# Patient Record
(demographics unavailable — no encounter records)

---

## 2024-11-27 NOTE — PHYSICAL EXAM
[Chaperone Present] : A chaperone was present in the examining room during all aspects of the physical examination [[id number]] : A chaperone was present during the pelvic exam. [Appropriately responsive] : appropriately responsive [Alert] : alert [No Acute Distress] : no acute distress [No Lymphadenopathy] : no lymphadenopathy [Soft] : soft [Non-tender] : non-tender [Non-distended] : non-distended [No HSM] : No HSM [No Lesions] : no lesions [No Mass] : no mass [Oriented x3] : oriented x3 [Examination Of The Breasts] : a normal appearance [No Discharge] : no discharge [No Masses] : no breast masses were palpable [Labia Majora] : normal [Labia Minora] : normal [Normal] : normal [Uterine Adnexae] : normal [Normal rectal exam] : was normal [FreeTextEntry2] : Sherri FOX-SINAN chaperoned during entire physical exam, [Occult Blood Positive] : was negative for occult blood analysis

## 2024-11-27 NOTE — HISTORY OF PRESENT ILLNESS
[TextBox_4] : Patient is a 74y/o female here today for annual visit. She recently had a breast biopsy followed by Dr. Ibarra, which she was recently discharged by her care. She is currently on prolia injections tolerating well. Her bone density is due 2/24. She has no gyn complaints at this time.

## 2024-11-27 NOTE — PHYSICAL EXAM
[Chaperone Present] : A chaperone was present in the examining room during all aspects of the physical examination [[id number]] : A chaperone was present during the pelvic exam. [Appropriately responsive] : appropriately responsive [Alert] : alert [No Acute Distress] : no acute distress [No Lymphadenopathy] : no lymphadenopathy [Soft] : soft [Non-tender] : non-tender [Non-distended] : non-distended [No HSM] : No HSM [No Lesions] : no lesions [No Mass] : no mass [Oriented x3] : oriented x3 [Examination Of The Breasts] : a normal appearance [No Discharge] : no discharge [No Masses] : no breast masses were palpable [Labia Majora] : normal [Labia Minora] : normal [Normal] : normal [Uterine Adnexae] : normal [Normal rectal exam] : was normal [FreeTextEntry2] : Sherri FOX-SINAN chaperoned during entire physical exam, [Occult Blood Positive] : was negative for occult blood analysis

## 2024-11-27 NOTE — PLAN
[FreeTextEntry1] : Patient to follow up in 1 year for annual GYN exam Mammogram and bilateral breast US due: 1/2025 rx given  Colonoscopy due:2026 [place]  Bone density due:  4/2026   Pap ordered Hemoccult ordered  All questions answered, patient agreeable with plan.    I Sherri FARR am scribing for the presence of Dr. Crockett the following sections HISTORY OF PRESENT ILLNESS, PAST MEDICAL/FAMILY/SOCIAL HISTORY; REVIEW OF SYSTEMS; VITAL SIGNS; PHYSICAL EXAM; DISPOSITION.    I personally performed the services described in the documentation, reviewed the documentation recorded by the scribe in my presence and it accurately and completely records my words and actions.

## 2024-11-27 NOTE — HISTORY OF PRESENT ILLNESS
[TextBox_4] : Patient is a 72y/o female here today for annual visit. She recently had a breast biopsy followed by Dr. Ibarra, which she was recently discharged by her care. She is currently on prolia injections tolerating well. Her bone density is due 2/24. She has no gyn complaints at this time.